# Patient Record
Sex: FEMALE | Race: BLACK OR AFRICAN AMERICAN | Employment: UNEMPLOYED | URBAN - METROPOLITAN AREA
[De-identification: names, ages, dates, MRNs, and addresses within clinical notes are randomized per-mention and may not be internally consistent; named-entity substitution may affect disease eponyms.]

---

## 2022-08-08 ENCOUNTER — HOSPITAL ENCOUNTER (EMERGENCY)
Dept: CT IMAGING | Age: 27
Discharge: HOME | End: 2022-08-11
Payer: MEDICAID

## 2022-08-08 ENCOUNTER — HOSPITAL ENCOUNTER (EMERGENCY)
Age: 27
Discharge: HOME | End: 2022-08-08
Attending: PHYSICIAN ASSISTANT
Payer: MEDICAID

## 2022-08-08 VITALS
DIASTOLIC BLOOD PRESSURE: 60 MMHG | TEMPERATURE: 98.7 F | HEART RATE: 75 BPM | OXYGEN SATURATION: 100 % | SYSTOLIC BLOOD PRESSURE: 104 MMHG | WEIGHT: 165 LBS | RESPIRATION RATE: 18 BRPM | BODY MASS INDEX: 26.52 KG/M2 | HEIGHT: 66 IN

## 2022-08-08 DIAGNOSIS — N39.0 URINARY TRACT INFECTION WITH HEMATURIA, SITE UNSPECIFIED: Primary | ICD-10-CM

## 2022-08-08 DIAGNOSIS — R31.9 URINARY TRACT INFECTION WITH HEMATURIA, SITE UNSPECIFIED: Primary | ICD-10-CM

## 2022-08-08 LAB
ALBUMIN SERPL-MCNC: 3.5 G/DL (ref 3.5–5)
ALBUMIN/GLOB SERPL: 0.9 {RATIO} (ref 1.2–3.5)
ALP SERPL-CCNC: 93 U/L (ref 50–130)
ALT SERPL-CCNC: 20 U/L (ref 12–65)
ANION GAP SERPL CALC-SCNC: 5 MMOL/L (ref 7–16)
APPEARANCE UR: ABNORMAL
AST SERPL-CCNC: 14 U/L (ref 15–37)
BACTERIA URNS QL MICRO: ABNORMAL /HPF
BASOPHILS # BLD: 0 K/UL (ref 0–0.2)
BASOPHILS NFR BLD: 0 % (ref 0–2)
BILIRUB SERPL-MCNC: 0.2 MG/DL (ref 0.2–1.1)
BILIRUB UR QL: ABNORMAL
BUN SERPL-MCNC: 5 MG/DL (ref 6–23)
CALCIUM SERPL-MCNC: 9 MG/DL (ref 8.3–10.4)
CHLORIDE SERPL-SCNC: 106 MMOL/L (ref 98–107)
CO2 SERPL-SCNC: 28 MMOL/L (ref 21–32)
COLOR UR: ABNORMAL
CREAT SERPL-MCNC: 0.74 MG/DL (ref 0.6–1)
CRYSTALS URNS QL MICRO: ABNORMAL /LPF
DIFFERENTIAL METHOD BLD: ABNORMAL
EOSINOPHIL # BLD: 0.1 K/UL (ref 0–0.8)
EOSINOPHIL NFR BLD: 1 % (ref 0.5–7.8)
EPI CELLS #/AREA URNS HPF: ABNORMAL /HPF
ERYTHROCYTE [DISTWIDTH] IN BLOOD BY AUTOMATED COUNT: 14 % (ref 11.9–14.6)
GLOBULIN SER CALC-MCNC: 3.8 G/DL (ref 2.3–3.5)
GLUCOSE SERPL-MCNC: 85 MG/DL (ref 65–100)
GLUCOSE UR STRIP.AUTO-MCNC: NEGATIVE MG/DL
HCG UR QL: NEGATIVE
HCT VFR BLD AUTO: 40.4 % (ref 35.8–46.3)
HGB BLD-MCNC: 13.1 G/DL (ref 11.7–15.4)
HGB UR QL STRIP: ABNORMAL
IMM GRANULOCYTES # BLD AUTO: 0 K/UL (ref 0–0.5)
IMM GRANULOCYTES NFR BLD AUTO: 0 % (ref 0–5)
KETONES UR QL STRIP.AUTO: 15 MG/DL
LEUKOCYTE ESTERASE UR QL STRIP.AUTO: ABNORMAL
LIPASE SERPL-CCNC: 79 U/L (ref 73–393)
LYMPHOCYTES # BLD: 3.5 K/UL (ref 0.5–4.6)
LYMPHOCYTES NFR BLD: 46 % (ref 13–44)
MCH RBC QN AUTO: 28.2 PG (ref 26.1–32.9)
MCHC RBC AUTO-ENTMCNC: 32.4 G/DL (ref 31.4–35)
MCV RBC AUTO: 87.1 FL (ref 79.6–97.8)
MONOCYTES # BLD: 0.6 K/UL (ref 0.1–1.3)
MONOCYTES NFR BLD: 8 % (ref 4–12)
MUCOUS THREADS URNS QL MICRO: ABNORMAL /LPF
NEUTS SEG # BLD: 3.5 K/UL (ref 1.7–8.2)
NEUTS SEG NFR BLD: 45 % (ref 43–78)
NITRITE UR QL STRIP.AUTO: NEGATIVE
NRBC # BLD: 0 K/UL (ref 0–0.2)
PH UR STRIP: 6.5 [PH] (ref 5–9)
PLATELET # BLD AUTO: 375 K/UL (ref 150–450)
PMV BLD AUTO: 9.7 FL (ref 9.4–12.3)
POTASSIUM SERPL-SCNC: 3.8 MMOL/L (ref 3.5–5.1)
PROT SERPL-MCNC: 7.3 G/DL (ref 6.3–8.2)
PROT UR STRIP-MCNC: 30 MG/DL
RBC # BLD AUTO: 4.64 M/UL (ref 4.05–5.2)
RBC #/AREA URNS HPF: ABNORMAL /HPF
SARS-COV-2 RDRP RESP QL NAA+PROBE: NOT DETECTED
SODIUM SERPL-SCNC: 139 MMOL/L (ref 136–145)
SOURCE: NORMAL
SP GR UR REFRACTOMETRY: >1.035 (ref 1–1.02)
UROBILINOGEN UR QL STRIP.AUTO: 1 EU/DL (ref 0.2–1)
WBC # BLD AUTO: 7.8 K/UL (ref 4.3–11.1)
WBC URNS QL MICRO: ABNORMAL /HPF
YEAST URNS QL MICRO: ABNORMAL

## 2022-08-08 PROCEDURE — 96361 HYDRATE IV INFUSION ADD-ON: CPT

## 2022-08-08 PROCEDURE — 80053 COMPREHEN METABOLIC PANEL: CPT

## 2022-08-08 PROCEDURE — 81001 URINALYSIS AUTO W/SCOPE: CPT

## 2022-08-08 PROCEDURE — 6360000002 HC RX W HCPCS: Performed by: PHYSICIAN ASSISTANT

## 2022-08-08 PROCEDURE — 99284 EMERGENCY DEPT VISIT MOD MDM: CPT

## 2022-08-08 PROCEDURE — 87635 SARS-COV-2 COVID-19 AMP PRB: CPT

## 2022-08-08 PROCEDURE — 81025 URINE PREGNANCY TEST: CPT

## 2022-08-08 PROCEDURE — 85025 COMPLETE CBC W/AUTO DIFF WBC: CPT

## 2022-08-08 PROCEDURE — 2580000003 HC RX 258: Performed by: PHYSICIAN ASSISTANT

## 2022-08-08 PROCEDURE — 87086 URINE CULTURE/COLONY COUNT: CPT

## 2022-08-08 PROCEDURE — 96375 TX/PRO/DX INJ NEW DRUG ADDON: CPT

## 2022-08-08 PROCEDURE — 2500000003 HC RX 250 WO HCPCS: Performed by: PHYSICIAN ASSISTANT

## 2022-08-08 PROCEDURE — 96374 THER/PROPH/DIAG INJ IV PUSH: CPT

## 2022-08-08 PROCEDURE — 83690 ASSAY OF LIPASE: CPT

## 2022-08-08 PROCEDURE — 74177 CT ABD & PELVIS W/CONTRAST: CPT

## 2022-08-08 PROCEDURE — A4216 STERILE WATER/SALINE, 10 ML: HCPCS | Performed by: PHYSICIAN ASSISTANT

## 2022-08-08 PROCEDURE — 6360000004 HC RX CONTRAST MEDICATION: Performed by: PHYSICIAN ASSISTANT

## 2022-08-08 RX ORDER — 0.9 % SODIUM CHLORIDE 0.9 %
1000 INTRAVENOUS SOLUTION INTRAVENOUS
Status: COMPLETED | OUTPATIENT
Start: 2022-08-08 | End: 2022-08-08

## 2022-08-08 RX ORDER — CEFDINIR 300 MG/1
300 CAPSULE ORAL 2 TIMES DAILY
Qty: 20 CAPSULE | Refills: 0 | Status: SHIPPED | OUTPATIENT
Start: 2022-08-08 | End: 2022-08-18

## 2022-08-08 RX ORDER — ONDANSETRON 4 MG/1
4 TABLET, ORALLY DISINTEGRATING ORAL EVERY 8 HOURS PRN
Qty: 20 TABLET | Refills: 0 | Status: SHIPPED | OUTPATIENT
Start: 2022-08-08

## 2022-08-08 RX ORDER — ONDANSETRON 2 MG/ML
4 INJECTION INTRAMUSCULAR; INTRAVENOUS
Status: COMPLETED | OUTPATIENT
Start: 2022-08-08 | End: 2022-08-08

## 2022-08-08 RX ORDER — PHENAZOPYRIDINE HYDROCHLORIDE 200 MG/1
200 TABLET, FILM COATED ORAL 3 TIMES DAILY PRN
Qty: 9 TABLET | Refills: 0 | Status: SHIPPED | OUTPATIENT
Start: 2022-08-08 | End: 2022-08-11

## 2022-08-08 RX ADMIN — ONDANSETRON 4 MG: 2 INJECTION INTRAMUSCULAR; INTRAVENOUS at 17:25

## 2022-08-08 RX ADMIN — CEFTRIAXONE 1000 MG: 1 INJECTION, POWDER, FOR SOLUTION INTRAMUSCULAR; INTRAVENOUS at 19:03

## 2022-08-08 RX ADMIN — FAMOTIDINE 20 MG: 10 INJECTION, SOLUTION INTRAVENOUS at 17:26

## 2022-08-08 RX ADMIN — SODIUM CHLORIDE 1000 ML: 9 INJECTION, SOLUTION INTRAVENOUS at 17:31

## 2022-08-08 RX ADMIN — IOPAMIDOL 100 ML: 755 INJECTION, SOLUTION INTRAVENOUS at 18:15

## 2022-08-08 ASSESSMENT — ENCOUNTER SYMPTOMS
ALLERGIC/IMMUNOLOGIC NEGATIVE: 1
NAUSEA: 1
SHORTNESS OF BREATH: 0
RESPIRATORY NEGATIVE: 1
ANAL BLEEDING: 0
RECTAL PAIN: 0
VOMITING: 1
DIARRHEA: 1
EYES NEGATIVE: 1
ABDOMINAL PAIN: 1
BLOOD IN STOOL: 0
ABDOMINAL DISTENTION: 0
CONSTIPATION: 0

## 2022-08-08 ASSESSMENT — PAIN DESCRIPTION - DESCRIPTORS: DESCRIPTORS: TENDER;SHARP

## 2022-08-08 ASSESSMENT — PAIN SCALES - GENERAL
PAINLEVEL_OUTOF10: 8
PAINLEVEL_OUTOF10: 8

## 2022-08-08 ASSESSMENT — PAIN DESCRIPTION - ORIENTATION: ORIENTATION: UPPER;LOWER

## 2022-08-08 ASSESSMENT — PAIN DESCRIPTION - LOCATION
LOCATION: ABDOMEN
LOCATION: ABDOMEN

## 2022-08-08 NOTE — Clinical Note
Carol Lombard was seen and treated in our emergency department on 8/8/2022. She may return to work on 08/11/2022. If you have any questions or concerns, please don't hesitate to call.       ADELA Mathias

## 2022-08-08 NOTE — Clinical Note
Morenita Alegre was seen and treated in our emergency department on 8/8/2022. She may return to work on 08/11/2022. If you have any questions or concerns, please don't hesitate to call.       ADELA Burton

## 2022-08-08 NOTE — ED PROVIDER NOTES
[facility] Emergency Department Provider Note                   PCP:                Rose Mary Duarte MD               Age: 28 y.o. Sex: female       ICD-10-CM    1. Urinary tract infection with hematuria, site unspecified  N39.0     R31.9           DISPOSITION Discharge - Pending Orders Complete 08/08/2022 06:58:37 PM        MDM     Amount and/or Complexity of Data Reviewed  Tests in the radiology section of CPT®: ordered and reviewed    Risk of Complications, Morbidity, and/or Mortality  Presenting problems: moderate  Diagnostic procedures: moderate  Management options: moderate  General comments: Patient is feeling much better after the IV fluids, Pepcid and Zofran here. Work-up is reassuring except for the urinalysis. I have added a urine culture. I will give 1 g IV ceftriaxone here and send her with Omnicef at home with Pyridium and Zofran ODT if needed. Urine culture was sent. She was instructed to return to the ED if she is worsening in any way and we will call her if the culture shows we do need to change her antibiotics. Patient is stable for discharge and ambulatory out of the ED without difficulty at this time. Patient Progress  Patient progress: improved       Orders Placed This Encounter   Procedures    COVID-19, Rapid    Culture, Urine    CT ABDOMEN PELVIS W IV CONTRAST Additional Contrast? Radiologist Recommendation    CBC with Diff    CMP    Lipase    Urinalysis w rflx microscopic    Diet NPO    POCT Urine Dipstick    POC Pregnancy Urine Qual    POC Pregnancy Urine Qual    Saline lock IV        José Palacios is a 28 y.o. female who presents to the Emergency Department with chief complaint of    Chief Complaint   Patient presents with    Emesis      Patient is here with nausea, vomiting, diarrhea with epigastric and right upper quadrant abdominal pain. She states this has been going on for 5 days. She is vomiting up stomach acid. She had her gallbladder removed in May of last year. No other surgery on her abdomen. Her last menstrual cycle started July 28 and was normal.  She did drive here. She went to urgent care prior to coming here and was told she had a UTI and that there was bilirubin in her urine so she should come to the emergency department. No chest pain, shortness of breath, dizziness, dyspnea on exertion, orthopnea, swelling/tingling or weakness to her arms or legs or other new symptoms. No one is sick at home. She did ambulate to the room without difficulty. The history is provided by the patient. Emesis  This is a new problem. The current episode started more than 2 days ago. The problem occurs constantly. The problem has not changed since onset. Associated symptoms include abdominal pain. Pertinent negatives include no chest pain, no headaches and no shortness of breath. Nothing aggravates the symptoms. Nothing relieves the symptoms. She has tried nothing for the symptoms. Review of Systems   Constitutional: Negative. Negative for fever. HENT: Negative. Eyes: Negative. Respiratory: Negative. Negative for shortness of breath. Cardiovascular: Negative. Negative for chest pain. Gastrointestinal:  Positive for abdominal pain, diarrhea, nausea and vomiting. Negative for abdominal distention, anal bleeding, blood in stool, constipation and rectal pain. Endocrine: Negative. Genitourinary: Negative. Musculoskeletal: Negative. Skin: Negative. Allergic/Immunologic: Negative. Neurological: Negative. Negative for headaches. Hematological: Negative. Psychiatric/Behavioral: Negative. All other systems reviewed and are negative.     Past Medical History:   Diagnosis Date    Chronic anemia 11/8/2015        Past Surgical History:   Procedure Laterality Date    CHOLECYSTECTOMY  05/12/2021        Family History   Adopted: Yes        Social History     Socioeconomic History    Marital status: Single   Tobacco Use    Smoking status: Never Smokeless tobacco: Never   Substance and Sexual Activity    Alcohol use: Yes    Drug use: Never         Patient has no known allergies. Previous Medications    No medications on file        Vitals signs and nursing note reviewed. Patient Vitals for the past 4 hrs:   Temp Pulse Resp BP SpO2   08/08/22 1837 -- -- -- -- 100 %   08/08/22 1802 -- -- -- 104/60 99 %   08/08/22 1732 -- -- -- 109/73 --   08/08/22 1545 99.4 °F (37.4 °C) 82 16 (!) 150/86 99 %          Physical Exam  Vitals and nursing note reviewed. Constitutional:       Appearance: Normal appearance. HENT:      Head: Normocephalic and atraumatic. Right Ear: External ear normal.      Left Ear: External ear normal.      Nose: Nose normal.      Mouth/Throat:      Mouth: Mucous membranes are moist.   Eyes:      Extraocular Movements: Extraocular movements intact. Conjunctiva/sclera: Conjunctivae normal.      Pupils: Pupils are equal, round, and reactive to light. Cardiovascular:      Rate and Rhythm: Normal rate and regular rhythm. Pulses: Normal pulses. Heart sounds: Normal heart sounds. Pulmonary:      Effort: Pulmonary effort is normal.      Breath sounds: Normal breath sounds. Abdominal:      General: Abdomen is flat. There is no distension. Palpations: Abdomen is soft. Tenderness: There is abdominal tenderness. There is rebound. Comments: Area of pain, there is generalized tenderness to the whole abdomen but no distention. Musculoskeletal:         General: Normal range of motion. Cervical back: Normal range of motion. Skin:     General: Skin is warm. Capillary Refill: Capillary refill takes less than 2 seconds. Neurological:      General: No focal deficit present. Mental Status: She is alert and oriented to person, place, and time. Mental status is at baseline. Psychiatric:         Mood and Affect: Mood normal.         Behavior: Behavior normal.         Thought Content:  Thought content normal.         Judgment: Judgment normal.        Procedures      Labs Reviewed   CBC WITH AUTO DIFFERENTIAL - Abnormal; Notable for the following components:       Result Value    Lymphocytes 46 (*)     All other components within normal limits   COMPREHENSIVE METABOLIC PANEL - Abnormal; Notable for the following components:    Anion Gap 5 (*)     BUN 5 (*)     AST 14 (*)     Globulin 3.8 (*)     Albumin/Globulin Ratio 0.9 (*)     All other components within normal limits   URINALYSIS - Abnormal; Notable for the following components:    Specific Gravity, UA >1.035 (*)     Protein, UA 30 (*)     Ketones, Urine 15 (*)     Bilirubin Urine SMALL (*)     Blood, Urine TRACE (*)     Leukocyte Esterase, Urine MODERATE (*)     BACTERIA, URINE 2+ (*)     Mucus, UA 1+ (*)     All other components within normal limits   COVID-19, RAPID   CULTURE, URINE   LIPASE   POC PREGNANCY UR-QUAL   POC PREGNANCY UR-QUAL        CT ABDOMEN PELVIS W IV CONTRAST Additional Contrast? Radiologist Recommendation   Final Result   No acute findings         If there are any questions about this report, I can be reached on [employer]   or at [phone]                                Voice dictation software was used during the making of this note. This software is not perfect and grammatical and other typographical errors may be present. This note has not been completely proofread for errors.      ADELA Galvin  08/08/22 [id number]

## 2022-08-08 NOTE — ED NOTES
I have reviewed discharge instructions with the patient. The patient verbalized understanding. Patient left ED via Discharge Method: ambulatory to Home with self. Opportunity for questions and clarification provided. Patient given 3 scripts. To continue your aftercare when you leave the hospital, you may receive an automated call from our care team to check in on how you are doing. This is a free service and part of our promise to provide the best care and service to meet your aftercare needs.  If you have questions, or wish to unsubscribe from this service please call [phone]. Thank you for Choosing our [facility] Emergency Department.         Erlinda Phelan RN  08/08/22 [id number]

## 2022-08-11 LAB
BACTERIA SPEC CULT: ABNORMAL
SERVICE CMNT-IMP: ABNORMAL

## 2022-08-11 NOTE — PROGRESS NOTES
ED pharmacist reviewed recent results of urine culture. The patient was treated with ceftriaxone 1g IV in the emergency department and received a prescription for cefdinir 300mg BID x10d upon discharge. Based on culture results, the patient is being adequately treated for the identified infection with existing antimicrobial therapy. No further intervention needed.     Allergies as of 08/08/2022    (No Known Allergies)     Tonya Weldon, Mark Anthony  Emergency Medicine Clinical Pharmacist